# Patient Record
Sex: FEMALE | Employment: FULL TIME | URBAN - METROPOLITAN AREA
[De-identification: names, ages, dates, MRNs, and addresses within clinical notes are randomized per-mention and may not be internally consistent; named-entity substitution may affect disease eponyms.]

---

## 2019-01-07 ENCOUNTER — COMMUNICATION - HEALTHEAST (OUTPATIENT)
Dept: TELEHEALTH | Facility: CLINIC | Age: 29
End: 2019-01-07

## 2019-01-07 ENCOUNTER — OFFICE VISIT - HEALTHEAST (OUTPATIENT)
Dept: FAMILY MEDICINE | Facility: CLINIC | Age: 29
End: 2019-01-07

## 2019-01-07 DIAGNOSIS — Z30.46 NEXPLANON REMOVAL: ICD-10-CM

## 2019-01-07 DIAGNOSIS — Z30.017 NEXPLANON INSERTION: ICD-10-CM

## 2019-01-07 LAB
HCG UR QL: NEGATIVE
SP GR UR STRIP: 1 (ref 1–1.03)

## 2019-03-01 ENCOUNTER — RECORDS - HEALTHEAST (OUTPATIENT)
Dept: ADMINISTRATIVE | Facility: OTHER | Age: 29
End: 2019-03-01

## 2019-07-03 ENCOUNTER — OFFICE VISIT (OUTPATIENT)
Dept: PEDIATRICS | Facility: CLINIC | Age: 29
End: 2019-07-03
Payer: COMMERCIAL

## 2019-07-03 VITALS
SYSTOLIC BLOOD PRESSURE: 100 MMHG | BODY MASS INDEX: 31.47 KG/M2 | OXYGEN SATURATION: 97 % | HEIGHT: 61 IN | WEIGHT: 166.7 LBS | DIASTOLIC BLOOD PRESSURE: 60 MMHG | HEART RATE: 74 BPM | TEMPERATURE: 98.2 F

## 2019-07-03 DIAGNOSIS — K52.9 GASTROENTERITIS: Primary | ICD-10-CM

## 2019-07-03 PROBLEM — B02.9 HERPES ZOSTER: Status: ACTIVE | Noted: 2019-07-03

## 2019-07-03 PROBLEM — Z30.017 INSERTION OF IMPLANTABLE SUBDERMAL CONTRACEPTIVE: Status: ACTIVE | Noted: 2019-07-03

## 2019-07-03 PROBLEM — R05.9 COUGH: Status: ACTIVE | Noted: 2019-07-03

## 2019-07-03 PROBLEM — F17.200 CURRENT SMOKER: Status: ACTIVE | Noted: 2019-07-03

## 2019-07-03 PROCEDURE — 99213 OFFICE O/P EST LOW 20 MIN: CPT | Performed by: FAMILY MEDICINE

## 2019-07-03 SDOH — HEALTH STABILITY: MENTAL HEALTH: HOW OFTEN DO YOU HAVE A DRINK CONTAINING ALCOHOL?: 2-4 TIMES A MONTH

## 2019-07-03 SDOH — HEALTH STABILITY: MENTAL HEALTH: HOW MANY STANDARD DRINKS CONTAINING ALCOHOL DO YOU HAVE ON A TYPICAL DAY?: 5 OR 6

## 2019-07-03 ASSESSMENT — MIFFLIN-ST. JEOR: SCORE: 1414.56

## 2019-07-03 NOTE — PATIENT INSTRUCTIONS
"Patient Education     Viral Gastroenteritis (Adult)    Gastroenteritis is commonly called the \"stomach flu,\" although it has nothing to do with influenza. It is most often caused by a virus that affects the stomach and intestinal tract and usually lasts from 2 to 7 days. Common viruses causing gastroenteritis include norovirus, rotavirus, and hepatitis A. Non-viral causes of gastroenteritis include bacteria, parasites, and toxins.  The danger from repeated vomiting or diarrhea is dehydration. This is the loss of too much fluid from the body. When this occurs, body fluids must be replaced. Antibiotics don't help with this illness because it is usually viral. Simple home treatment will be helpful.  Symptoms of viral gastroenteritis may include:    Watery, loose stools    Stomach pain or abdominal cramps    Fever and chills    Nausea and vomiting    Loss of bowel control    Headache  Home care  Gastroenteritis is transmitted by contact with the stool or vomit of an infected person. This can occur from person to person or from contact with a contaminated surface.  Follow these guidelines when caring for yourself at home:    If symptoms are severe, rest at home for the next 24 hours or until you are feeling better.    Wash your hands with soap and water or use alcohol-based  to prevent the spread of infection. Wash your hands after touching anyone who is sick.    Wash your hands or use alcohol-based  after using the toilet and before meals. Clean the toilet after each use.  Remember these tips when preparing food:    People with diarrhea should not prepare or serve food to others. When preparing foods, wash your hands before and after.    Wash your hands after using cutting boards, countertops, knives, or utensils that have been in contact with raw food.    Dry your hands with a single use towel.    Keep uncooked meats away from cooked and ready-to-eat foods.  Medicine  You may use acetaminophen or " NSAID medicines like ibuprofen or naproxen to control fever unless another medicine was given. If you have chronic liver or kidney disease, talk with your healthcare provider before using these medicines. Also talk with your provider if you've had a stomach ulcer or gastrointestinal bleeding. Don't give aspirin to anyone under 18 years of age who is ill with a fever. It may cause severe liver damage. Don't use NSAIDS is you are already taking one for another condition (like arthritis) or are on aspirin (such as for heart disease or after a stroke).  If medicine for vomiting or diarrhea are prescribed, take these only as directed. Nausea and diarrhea medicines are generally OK unless you have bleeding, fever, or severe abdominal pain.  Diet  Follow these guidelines for food:    Water and liquids are important so you don't get dehydrated. Drink a small amount at a time or suck on ice chips if you are vomiting.    If you eat, avoid fatty, greasy, spicy, or fried foods.    Don't eat dairy if you have diarrhea. This can make diarrhea worse.    Avoid tobacco, alcohol, and caffeine which may worsen symptoms.  During the first 24 hours (the first full day), follow the diet below:    Beverages. Sports drinks, soft drinks without caffeine, ginger ale, mineral water (plain or flavored), decaffeinated tea and coffee. If you are very dehydrated, sports drinks aren't a good choice. They have too much sugar and not enough electrolytes. In this case, commercially available products called oral rehydration solutions, are best.    Soups. Eat clear broth, consommé, and bouillon.    Desserts. Eat gelatin, ice pops, and fruit juice bars.  During the next 24 hours (the second day), you may add the following to the above:    Hot cereal, plain toast, bread, rolls, and crackers    Plain noodles, rice, mashed potatoes, chicken noodle or rice soup    Unsweetened canned fruit (avoid pineapple), bananas    Limit fat intake to less than 15 grams  per day. Do this by avoiding margarine, butter, oils, mayonnaise, sauces, gravies, fried foods, peanut butter, meat, poultry, and fish.    Limit fiber and avoid raw or cooked vegetables, fresh fruits (except bananas), and bran cereals.    Limit caffeine and chocolate. Don't use spices or seasonings other than salt.    Limit dairy products.    Avoid alcohol.  During the next 24 hours:    Gradually resume a normal diet as you feel better and your symptoms improve.    If at any time it starts getting worse again, go back to clear liquids until you feel better.  Follow-up care  Follow up with your healthcare provider, or as advised. Call your provider if you don't get better within 24 hours or if diarrhea lasts more than a week. Also follow up if you are unable to keep down liquids and get dehydrated. If a stool (diarrhea) sample was taken, call as directed for the results.  Call 911  Call 911 if any of these occur:    Trouble breathing    Chest pain    Confused    Severe drowsiness or trouble awakening    Fainting or loss of consciousness    Rapid heart rate    Seizure    Stiff neck  When to seek medical advice  Call your healthcare provider right away if any of these occur:    Abdominal pain that gets worse    Continued vomiting (unable to keep liquids down)    Frequent diarrhea (more than 5 times a day)    Blood in vomit or stool (black or red color)    Dark urine, reduced urine output, or extreme thirst    Weakness or dizziness    Drowsiness    Fever of 100.4 F (38 C) or higher, or as directed by your healthcare provider    New rash  Date Last Reviewed: 6/1/2018 2000-2018 [email or web address]. [address] [place] [address], [place] [address]. All rights reserved. This information is not intended as a substitute for professional medical care. Always follow your healthcare professional's instructions.

## 2019-07-03 NOTE — PROGRESS NOTES
"Subjective     Anne Marie Negron is a 29 year old female who presents to clinic today for the following health issues:    HPI   Gastrointestinal symptoms      Duration: 3 days    Description:           Stomach cramping, diarrhea- anytime ate bigger meal. Overnight and today has been ok    Intensity:  mild    Accompanying signs and symptoms:  diarrhea    History  Previous {similar problem: YES- age 14 dx w/ IBS, gone off and on  Previous evaluation:  none    Aggravating factors: none    Alleviating factors: nothing    Other Therapies tried: None  Recent travel to [place], camping, drank tap water.    Reviewed and updated as needed this visit by Provider  Med Hx  Surg Hx  Fam Hx         Review of Systems   ROS COMP: Constitutional, HEENT, cardiovascular, pulmonary, gi and gu systems are negative, except as otherwise noted.      Objective    /60   Pulse 74   Temp 98.2  F (36.8  C)   Ht 1.543 m (5' 0.75\")   Wt 75.6 kg (166 lb 11.2 oz)   LMP 07/01/2019   SpO2 97%   BMI 31.76 kg/m    Body mass index is 31.76 kg/m .  Physical Exam   GENERAL: healthy, alert and no distress  EYES: Eyes grossly normal to inspection, PERRL and conjunctivae and sclerae normal  HENT: ear canals and TM's normal, nose and mouth without ulcers or lesions  NECK: no adenopathy, no asymmetry, masses, or scars and thyroid normal to palpation  RESP: lungs clear to auscultation - no rales, rhonchi or wheezes  CV: regular rate and rhythm, normal S1 S2, no S3 or S4, no murmur, click or rub, no peripheral edema and peripheral pulses strong  ABDOMEN: soft, nontender, no hepatosplenomegaly, no masses and bowel sounds normal  MS: no gross musculoskeletal defects noted, no edema          Assessment & Plan     1. Gastroenteritis  PLAN:  1)  Symptomatic treatment.  2)  Clear liquids in frequent, small amounts, advance diet as   tolerated.  3)  Recheck prn persistence, worsening, appearance of new symptoms.    PE:  Discussed probable viral " etiology and principles of dietary   treatment.         Return if symptoms worsen or fail to improve.    Seymour Andino MD  [facility]

## 2020-03-11 ENCOUNTER — HEALTH MAINTENANCE LETTER (OUTPATIENT)
Age: 30
End: 2020-03-11

## 2021-01-03 ENCOUNTER — HEALTH MAINTENANCE LETTER (OUTPATIENT)
Age: 31
End: 2021-01-03

## 2021-04-25 ENCOUNTER — HEALTH MAINTENANCE LETTER (OUTPATIENT)
Age: 31
End: 2021-04-25

## 2021-05-29 ENCOUNTER — RECORDS - HEALTHEAST (OUTPATIENT)
Dept: ADMINISTRATIVE | Facility: CLINIC | Age: 31
End: 2021-05-29

## 2021-06-02 VITALS — WEIGHT: 165.38 LBS

## 2021-06-22 NOTE — PROGRESS NOTES
Assessment/Plan:     1. Nexplanon removal  Pregnancy, Urine   2. Nexplanon insertion  Pregnancy, Urine       Diagnoses and all orders for this visit:    Nexplanon removal  -     Pregnancy, Urine    Nexplanon insertion  -     Pregnancy, Urine         Nexplanon removal and reinsertion: Procedure and risks discussed with pt who agreed to proceed with nexplanon removal. Consent form was reviewed with her and signed. This will be placed in her chart.  Implant identified in right upper arm. Guidemarks made. Skin was cleansed with alcohol swab.  Local anesthetic administered with approximately 2.5 mL 1% lidocaine with epinephrine.  Skin was then cleansed with 3 Betadine swabs. Number 11 blade used to make approximately 1 cm horizontal incision near distal end of implant. Subcutaneous tissue dissected and distal end of implant identified and grasped with needle lou. After further dissection of surrounding adhesions, implant was removed in its entirety and placed in biohazard bag for disposal. An additional 2 cm of lidocaine with epinephrine was then injected along the planned insertion route for additional anesthesia.  Implant was then inserted according to 's instructions.  Following insertion I was able to palpate the entire 4 cm implant in the appropriate position.   Skin was cleansed with soap and water. Steristrips were used to approximate wound edges.  Bandages placed over insertion site.  Compression dressing was applied.  She was advised to keep compression dressing on for 24 hours.  Keep wound covered with bandage for 2-3 days.  Monitor for signs of infection.  Discussed she will experience bruising and soreness at the insertion site.  May use ibuprofen as needed for discomfort.  Discussed warning signs to watch out for.  This should be removed no later than 1/7/2022.  Lot number of this device is [id number].             Subjective:      Anne Marie Negron is a 28 y.o. female presents for removal and  re-insertion of nexplanon. Placed nearly 3 years ago. Has been having more irregular bleeding lately.  Overall feels that this continues to work well for her and desires to continue with Nexplanon for contraception.  No other concerns today.  Medications and allergies reviewed and updated.  No other regular medications.  No significant changes in health since last office visit.  She is engaged to be .  Wedding planned for April.      Current Outpatient Medications   Medication Sig Dispense Refill     etonogestrel (NEXPLANON) 68 mg Impl implant 1 each by Subdermal route once.       No current facility-administered medications for this visit.        Past Medical History, Family History, and Social History reviewed.  No past medical history on file.  No past surgical history on file.  Patient has no known allergies.  No family history on file.  Social History     Socioeconomic History     Marital status: Single     Spouse name: Not on file     Number of children: Not on file     Years of education: Not on file     Highest education level: Not on file   Social Needs     Financial resource strain: Not on file     Food insecurity - worry: Not on file     Food insecurity - inability: Not on file     Transportation needs - medical: Not on file     Transportation needs - non-medical: Not on file   Occupational History     Not on file   Tobacco Use     Smoking status: Former Smoker     Packs/day: 0.50     Years: 10.00     Pack years: 5.00     Smokeless tobacco: Never Used     Tobacco comment: quit august 2017   Substance and Sexual Activity     Alcohol use: Yes     Alcohol/week: 3.6 oz     Types: 3 Glasses of wine, 3 Cans of beer per week     Frequency: 2-4 times a month     Drinks per session: 5 or 6     Binge frequency: Weekly     Drug use: No     Sexual activity: Not on file     Comment: nexplanon placed 1/7/2019 by DR Roy    Other Topics Concern     Not on file   Social History Narrative     Not on file          Review of systems is as stated in HPI, and the remainder of the 10 system review is otherwise negative.    Objective:     Vitals:    01/07/19 1622   BP: 120/79   Patient Site: Left Arm   Patient Position: Sitting   Cuff Size: Adult Large   Pulse: 87   Temp: 99.1  F (37.3  C)   TempSrc: Oral   SpO2: 98%   Weight: 165 lb 6 oz (75 kg)    There is no height or weight on file to calculate BMI.    Gen: No acute distress  Skin: nexplanon palpated in medial aspect of right upper arm    Recent Results (from the past 24 hour(s))   Pregnancy, Urine    Collection Time: 01/07/19  4:08 PM   Result Value Ref Range    Pregnancy Test, Urine Negative Negative    Specific Gravity, UA 1.000 (L) 1.001 - 1.030       This note has been dictated using voice recognition software. Any grammatical or context distortions are unintentional and inherent to the the software.

## 2021-10-10 ENCOUNTER — HEALTH MAINTENANCE LETTER (OUTPATIENT)
Age: 31
End: 2021-10-10

## 2022-05-21 ENCOUNTER — HEALTH MAINTENANCE LETTER (OUTPATIENT)
Age: 32
End: 2022-05-21

## 2022-09-18 ENCOUNTER — HEALTH MAINTENANCE LETTER (OUTPATIENT)
Age: 32
End: 2022-09-18

## 2023-06-04 ENCOUNTER — HEALTH MAINTENANCE LETTER (OUTPATIENT)
Age: 33
End: 2023-06-04